# Patient Record
Sex: MALE | Employment: OTHER | URBAN - METROPOLITAN AREA
[De-identification: names, ages, dates, MRNs, and addresses within clinical notes are randomized per-mention and may not be internally consistent; named-entity substitution may affect disease eponyms.]

---

## 2017-03-14 ENCOUNTER — THERAPY VISIT (OUTPATIENT)
Dept: PHYSICAL THERAPY | Facility: CLINIC | Age: 77
End: 2017-03-14
Payer: COMMERCIAL

## 2017-03-14 DIAGNOSIS — M54.2 CERVICALGIA: Primary | ICD-10-CM

## 2017-03-14 PROCEDURE — 97161 PT EVAL LOW COMPLEX 20 MIN: CPT | Mod: GP | Performed by: PHYSICAL THERAPIST

## 2017-03-14 PROCEDURE — 97140 MANUAL THERAPY 1/> REGIONS: CPT | Mod: GP | Performed by: PHYSICAL THERAPIST

## 2017-03-14 NOTE — LETTER
"[facility] ATHLETIC [facility] PHYSICAL THERAPY  [address]  [facility] [address]  [phone]    March 15, 2017    Re: German Manzano   :   1940  MRN:  [id number]   REFERRING PHYSICIAN:   Jens Calderon    [facility] ATHLETIC [facility] PHYSICAL [facility]    Date of Initial Evaluation:  2017  Visits:  Rxs Used: 1  Reason for Referral:  Cervicalgia    EVALUATION SUMMARY    Subjective:  German Manzano is a 76 year old male with a cervical spine condition.  Condition occurred with:  Insidious onset.  Condition occurred: for unknown reasons.  This is a new condition  Patient reports L neck, upper trap and upper arm since about 2/15/17 without specific/known cause.  Radiates to: denies numbness/tingling/weakness or pain below the elbow.  Pain is described as aching and is constant and reported as 5/10.  Associated symptoms:  Loss of motion/stiffness. Pain is worse during the night.  Exacerbated by: turning head to the left, sleep is disturbed. Relieved by: heat, acetaminophen.  Since onset symptoms are unchanged.  Special testing: none.  Previous treatment: prednisone---but discontinued due to swelling in feet, mm relaxant--but discontinued due to feeling \"goofy\" in mornings. General health as reported by patient is good.  Pertinent medical history includes:  Overweight and sleep disorder/apnea.  Medical allergies: no.  Surgical history: hernia R side.  Current medications:  Pain medication, high blood pressure medication, anti-depressants and sleep medication (ibuprofen).  Current occupation is retired.      Barriers include:  None as reported by the patient.  Red flags:  None as reported by the patient.                  Objective:  CERVICAL:  Posture: poor, forward head, slouched, kyphotic (some structural rounding of upper Tspine)  Posture Correction: no effect    Neurological:  Motor Deficit:  Myotomes L R   C4 (shoulder elevation) 5 5   C5 " (shoulder abduction) 5 5   C6 (elbow flexion) 5 5   C7 (elbow extension) 5 5   C8 (thumb extension) 5 5   T1 (finger add/abd) 5 5    Strength (lb) WNL WNL     Sensory Deficit, Reflexes, Dural Signs: intact light touch screen B UE dermatomes    AROM: (Major, Moderate, Minimal or Nil loss)  Movement Loss Walter Mod Min Nil Pain   Protrusion    X Pain decreases   Flexion    X Pain decreases   Retraction   X  Increased pain L upper trap   Extension  X   Increased pain L upper trap   Left Rotation   X  Increased pain L upper trap   Right Rotation    X No effect   Left Side Bending   X  No effect   Right Side bending   X  Increased pain L upper trap     Repeated movement testing:   (During: produces, abolishes, increases, decreases, no effect, centralizing, peripheralizing; After: better, worse, no better, no worse, no effect, centralized, peripheralized)    Pre-test Symptoms Sitting: L neck and upper trap pain   Symptoms During Symptoms After ROM increased ROM decreased No Effect   PRO        Rep PRO        RET        Rep RET increased No worse   X   RET EXT        Rep RET EXT increased No worse   X   LF - R        Rep LF - R        LF - L        Rep LF - L No effect No effect X slight improvement in L rotation after     ROT - R        Rep ROT - R        ROT - L        Rep ROT - L        FLEX        Rep FLEX          Palpation: tender L neck and upper trap    Re: German Manzano   :   1940    Provisional Classification: possible derangement  Principle of Management: will initiate L side bending in sitting, scapular stabilization, soft tissue mobilization, will continue heat at home.      Assessment/Plan:    Patient is a 76 year old male with cervical complaints.    Patient has the following significant findings with corresponding treatment plan.                Diagnosis 1:  Cervicalgia, L side with pain to upper trapezius and L upper arm    Pain -  hot/cold therapy, US, mechanical traction, manual therapy and  directional preference exercise  Decreased ROM/flexibility - manual therapy and therapeutic exercise  Decreased strength - therapeutic exercise and therapeutic activities  Decreased proprioception - neuro re-education and therapeutic activities  Decreased function - therapeutic activities  Impaired posture - neuro re-education    Therapy Evaluation Codes:   1) History comprised of:   Personal factors that impact the plan of care:      None.    Comorbidity factors that impact the plan of care are:      Overweight and Sleep disorder/apnea.     Medications impacting care: Anti-depressant, Anti-inflammatory, High blood pressure, Pain and Sleep.  2) Examination of Body Systems comprised of:   Body structures and functions that impact the plan of care:      Cervical spine.   Activity limitations that impact the plan of care are:      Driving, Dressing, Lifting, Sleeping and Laying down.  3) Clinical presentation characteristics are:   Stable/Uncomplicated.  4) Decision-Making    Low complexity using standardized patient assessment instrument and/or measureable assessment of functional outcome.  Cumulative Therapy Evaluation is: Low complexity.    Previous and current functional limitations:  (See Goal Flow Sheet for this information)    Short term and Long term goals: (See Goal Flow Sheet for this information)     Communication ability:  Patient appears to be able to clearly communicate and understand verbal and written communication and follow directions correctly.  Treatment Explanation - The following has been discussed with the patient:   RX ordered/plan of care  Anticipated outcomes  Possible risks and side effects  This patient would benefit from PT intervention to resume normal activities.   Rehab potential is good.  Re: German Manzano   :   1940    Frequency:  1 X week, once daily  Duration:  for 6 weeks  Discharge Plan:  Achieve all LTG.  Independent in home treatment program.  Reach maximal therapeutic  benefit.    Thank you for your referral.    INQUIRIES  Therapist: Johnnie Garcia DPT  [facility]AP[facility]  [address] [address]  Phone: [phone]  Fax: [phone]

## 2017-03-14 NOTE — PROGRESS NOTES
"[facility] Initial Evaluation    Subjective:    German Manzano is a 76 year old male with a cervical spine condition.  Condition occurred with:  Insidious onset.  Condition occurred: for unknown reasons.  This is a new condition  Patient reports L neck, upper trap and upper arm since about 2/15/17 without specific/known cause. .      Radiates to: denies numbness/tingling/weakness or pain below the elbow.  Pain is described as aching and is constant and reported as 5/10.  Associated symptoms:  Loss of motion/stiffness. Pain is worse during the night.  Exacerbated by: turning head to the left, sleep is disturbed. Relieved by: heat, acetaminophen.  Since onset symptoms are unchanged.  Special testing: none.  Previous treatment: prednisone---but discontinued due to swelling in feet, mm relaxant--but discontinued due to feeling \"goofy\" in mornings.    General health as reported by patient is good.  Pertinent medical history includes:  Overweight and sleep disorder/apnea.  Medical allergies: no.  Surgical history: hernia R side.  Current medications:  Pain medication, high blood pressure medication, anti-depressants and sleep medication (ibuprofen).  Current occupation is retired.        Barriers include:  None as reported by the patient.    Red flags:  None as reported by the patient.                      Objective:  CERVICAL:    Posture: poor, forward head, slouched, kyphotic (some structural rounding of upper Tspine)  Posture Correction: no effect    Neurological:    Motor Deficit:  Myotomes L R   C4 (shoulder elevation) 5 5   C5 (shoulder abduction) 5 5   C6 (elbow flexion) 5 5   C7 (elbow extension) 5 5   C8 (thumb extension) 5 5   T1 (finger add/abd) 5 5    Strength (lb) WNL WNL     Sensory Deficit, Reflexes, Dural Signs: intact light touch screen B UE dermatomes    AROM: (Major, Moderate, Minimal or Nil loss)  Movement Loss Walter Mod Min Nil Pain   Protrusion    X Pain decreases   Flexion    X " Pain decreases   Retraction   X  Increased pain L upper trap   Extension  X   Increased pain L upper trap   Left Rotation   X  Increased pain L upper trap   Right Rotation    X No effect   Left Side Bending   X  No effect   Right Side bending   X  Increased pain L upper trap     Repeated movement testing:   (During: produces, abolishes, increases, decreases, no effect, centralizing, peripheralizing; After: better, worse, no better, no worse, no effect, centralized, peripheralized)    Pre-test Symptoms Sitting: L neck and upper trap pain   Symptoms During Symptoms After ROM increased ROM decreased No Effect   PRO        Rep PRO        RET        Rep RET increased No worse   X   RET EXT        Rep RET EXT increased No worse   X   LF - R        Rep LF - R        LF - L        Rep LF - L No effect No effect X slight improvement in L rotation after     ROT - R        Rep ROT - R        ROT - L        Rep ROT - L        FLEX        Rep FLEX          Palpation: tender L neck and upper trap    Provisional Classification: possible derangement  Principle of Management: will initiate L side bending in sitting, scapular stabilization, soft tissue mobilization, will continue heat at home.      System    Physical Exam    General     ROS    Assessment/Plan:      Patient is a 76 year old male with cervical complaints.    Patient has the following significant findings with corresponding treatment plan.                Diagnosis 1:  Cervicalgia, L side with pain to upper trapezius and L upper arm    Pain -  hot/cold therapy, US, mechanical traction, manual therapy and directional preference exercise  Decreased ROM/flexibility - manual therapy and therapeutic exercise  Decreased strength - therapeutic exercise and therapeutic activities  Decreased proprioception - neuro re-education and therapeutic activities  Decreased function - therapeutic activities  Impaired posture - neuro re-education    Therapy Evaluation Codes:   1) History  comprised of:   Personal factors that impact the plan of care:      None.    Comorbidity factors that impact the plan of care are:      Overweight and Sleep disorder/apnea.     Medications impacting care: Anti-depressant, Anti-inflammatory, High blood pressure, Pain and Sleep.  2) Examination of Body Systems comprised of:   Body structures and functions that impact the plan of care:      Cervical spine.   Activity limitations that impact the plan of care are:      Driving, Dressing, Lifting, Sleeping and Laying down.  3) Clinical presentation characteristics are:   Stable/Uncomplicated.  4) Decision-Making    Low complexity using standardized patient assessment instrument and/or measureable assessment of functional outcome.  Cumulative Therapy Evaluation is: Low complexity.    Previous and current functional limitations:  (See Goal Flow Sheet for this information)    Short term and Long term goals: (See Goal Flow Sheet for this information)     Communication ability:  Patient appears to be able to clearly communicate and understand verbal and written communication and follow directions correctly.  Treatment Explanation - The following has been discussed with the patient:   RX ordered/plan of care  Anticipated outcomes  Possible risks and side effects  This patient would benefit from PT intervention to resume normal activities.   Rehab potential is good.    Frequency:  1 X week, once daily  Duration:  for 6 weeks  Discharge Plan:  Achieve all LTG.  Independent in home treatment program.  Reach maximal therapeutic benefit.    Please refer to the daily flowsheet for treatment today, total treatment time and time spent performing 1:1 timed codes.

## 2017-03-14 NOTE — MR AVS SNAPSHOT
"              After Visit Summary   3/14/2017    German Manzano    MRN: [id number]           Patient Information     Date Of Birth          1940        Visit Information        Provider Department      3/14/2017 11:10 AM Segundo Garcia PT [facility] Physical [facility]        Today's Diagnoses     Cervicalgia    -  1       Follow-ups after your visit        Your next 10 appointments already scheduled     Mar 21, 2017 11:10 AM CDT   LIVIA Spine with Segundo Garcia PT   [facility] Physical Therapy ([facility])    [address] [address]   [phone]              Who to contact     If you have questions or need follow up information about today's clinic visit or your schedule please contact [facility] PHYSICAL [facility] directly at [phone].  Normal or non-critical lab and imaging results will be communicated to you by MyChart, letter or phone within 4 business days after the clinic has received the results. If you do not hear from us within 7 days, please contact the clinic through [employer] or phone. If you have a critical or abnormal lab result, we will notify you by phone as soon as possible.  Submit refill requests through [employer] or call your pharmacy and they will forward the refill request to us. Please allow 3 business days for your refill to be completed.          Additional Information About Your Visit        MyChart Information     [employer] lets you send messages to your doctor, view your test results, renew your prescriptions, schedule appointments and more. To sign up, go to [email or web address] . Click on \"Log in\" on the left side of the screen, which will take you to the Welcome page. Then click on \"Sign up Now\" on the right side of the page.     You will be asked to enter the access code listed below, as well as some personal information. Please " follow the directions to create your username and password.     Your access code is: [email or web address]  Expires: 2017  3:21 PM     Your access code will  in 90 days. If you need help or a new code, please call your [place] clinic or [phone].        Care EveryWhere ID     This is your Care EveryWhere ID. This could be used by other organizations to access your [place] medical records  [id number]         Blood Pressure from Last 3 Encounters:   12/15/14 161/78    Weight from Last 3 Encounters:   12/15/14 96.6 kg (213 lb)              We Performed the Following     HC PT EVAL, LOW COMPLEXITY     LIVIA INITIAL EVAL REPORT     MANUAL THER TECH,1+REGIONS,EA 15 MIN        Primary Care Provider    None Specified       No primary provider on file.        Thank you!     Thank you for choosing [facility] PHYSICAL THERAPY  for your care. Our goal is always to provide you with excellent care. Hearing back from our patients is one way we can continue to improve our services. Please take a few minutes to complete the written survey that you may receive in the mail after your visit with us. Thank you!             Your Updated Medication List - Protect others around you: Learn how to safely use, store and throw away your medicines at www.disposemymeds.org.          This list is accurate as of: 3/14/17  3:21 PM.  Always use your most recent med list.                   Brand Name Dispense Instructions for use    allopurinol 20 mg/mL Susp    ZYLOPRIM     Take 300 mg by mouth daily       AMBIEN PO      Take 10 mg by mouth       amoxicillin-clavulanate 875-125 MG per tablet    AUGMENTIN     Take 1 tablet by mouth 2 times daily       DIAZEPAM PO      Take 5 mg by mouth every 6 hours as needed for anxiety       FLOMAX 0.4 MG capsule   Generic drug:  tamsulosin      Take by mouth daily       polyethylene glycol Packet    MIRALAX/GLYCOLAX     Take 1 packet by mouth daily       potassium  chloride 20 MEQ Packet    KLOR-CON     Take 20 mEq by mouth 2 times daily       PROPRANOLOL HCL PO      Take 20 mg by mouth 2 times daily       PROSCAR PO      Take by mouth daily       REMERON PO      Take 15 mg by mouth       SIMVASTATIN PO      Take 10 mg by mouth

## 2017-03-21 ENCOUNTER — THERAPY VISIT (OUTPATIENT)
Dept: PHYSICAL THERAPY | Facility: CLINIC | Age: 77
End: 2017-03-21
Payer: COMMERCIAL

## 2017-03-21 DIAGNOSIS — M54.2 CERVICALGIA: ICD-10-CM

## 2017-03-21 PROCEDURE — 97140 MANUAL THERAPY 1/> REGIONS: CPT | Mod: GP | Performed by: PHYSICAL THERAPIST

## 2017-03-21 PROCEDURE — 97035 APP MDLTY 1+ULTRASOUND EA 15: CPT | Mod: GP | Performed by: PHYSICAL THERAPIST

## 2017-03-21 PROCEDURE — 97110 THERAPEUTIC EXERCISES: CPT | Mod: GP | Performed by: PHYSICAL THERAPIST

## 2017-03-21 NOTE — MR AVS SNAPSHOT
"              After Visit Summary   3/21/2017    German Manzano    MRN: [id number]           Patient Information     Date Of Birth          1940        Visit Information        Provider Department      3/21/2017 11:10 AM Segundo Garcia PT [facility] Physical Therapy        Today's Diagnoses     Cervicalgia           Follow-ups after your visit        Your next 10 appointments already scheduled     Mar 28, 2017 11:10 AM CDT   [facility] Spine with Segundo Garcia PT   [facility] ([facility]    [address] [address] [phone]              Who to contact     If you have questions or need follow up information about today's clinic visit or your schedule please contact [facility] [employer] [facility] PHYSICAL [facility] directly at [phone].  Normal or non-critical lab and imaging results will be communicated to you by [employer], letter or phone within 4 business days after the clinic has received the results. If you do not hear from us within 7 days, please contact the clinic through [employer] or phone. If you have a critical or abnormal lab result, we will notify you by phone as soon as possible.  Submit refill requests through [employer] or call your pharmacy and they will forward the refill request to us. Please allow 3 business days for your refill to be completed.          Additional Information About Your Visit        MyChart Information     [employer] lets you send messages to your doctor, view your test results, renew your prescriptions, schedule appointments and more. To sign up, go to [email or web address] . Click on \"Log in\" on the left side of the screen, which will take you to the Welcome page. Then click on \"Sign up Now\" on the right side of the page.     You will be asked to enter the access code listed below, as well as some personal information. Please " follow the directions to create your username and password.     Your access code is: [email or web address]  Expires: 2017  3:21 PM     Your access code will  in 90 days. If you need help or a new code, please call your [place] clinic or [phone].        Care EveryWhere ID     This is your Care EveryWhere ID. This could be used by other organizations to access your [place] medical records  [id number]         Blood Pressure from Last 3 Encounters:   12/15/14 161/78    Weight from Last 3 Encounters:   12/15/14 96.6 kg (213 lb)              We Performed the Following     MANUAL THER TECH,1+REGIONS,EA 15 MIN     THERAPEUTIC EXERCISES     ULTRASOUND THERAPY        Primary Care Provider    None Specified       No primary provider on file.        Thank you!     Thank you for choosing [place] [facility] PHYSICAL THERAPY  for your care. Our goal is always to provide you with excellent care. Hearing back from our patients is one way we can continue to improve our services. Please take a few minutes to complete the written survey that you may receive in the mail after your visit with us. Thank you!             Your Updated Medication List - Protect others around you: Learn how to safely use, store and throw away your medicines at www.disposemymeds.org.          This list is accurate as of: 3/21/17 12:03 PM.  Always use your most recent med list.                   Brand Name Dispense Instructions for use    allopurinol 20 mg/mL Susp    ZYLOPRIM     Take 300 mg by mouth daily       AMBIEN PO      Take 10 mg by mouth       amoxicillin-clavulanate 875-125 MG per tablet    AUGMENTIN     Take 1 tablet by mouth 2 times daily       DIAZEPAM PO      Take 5 mg by mouth every 6 hours as needed for anxiety       FLOMAX 0.4 MG capsule   Generic drug:  tamsulosin      Take by mouth daily       polyethylene glycol Packet    MIRALAX/GLYCOLAX     Take 1 packet by mouth daily       potassium chloride 20 MEQ  Packet    KLOR-CON     Take 20 mEq by mouth 2 times daily       PROPRANOLOL HCL PO      Take 20 mg by mouth 2 times daily       PROSCAR PO      Take by mouth daily       REMERON PO      Take 15 mg by mouth       SIMVASTATIN PO      Take 10 mg by mouth

## 2017-05-02 PROBLEM — M54.2 CERVICALGIA: Status: RESOLVED | Noted: 2017-03-14 | Resolved: 2017-05-02

## 2017-05-02 NOTE — PROGRESS NOTES
Subjective:    HPI                    Objective:    System    Physical Exam    General     ROS    Assessment/Plan:      DISCHARGE REPORT    Progress reporting period is from 3/14/17 to 3/21/17 (2 visits).       SUBJECTIVE  Subjective changes noted by patient:  Symptoms mostly the same.  Same area.  Maybe slightly better (4 down from 5/10).      Current pain level is  4/10.     Previous pain level was 5/10.   Changes in function:  None  Adverse reaction to treatment or activity: None    OBJECTIVE  Changes noted in objective findings:    Objective: Cervical AROM pain with extension and L rotation yet.       ASSESSMENT/PLAN  Updated problem list and treatment plan: Diagnosis 1:  Cervicalgia(L neck and upper trap pain to upper arm )  Progress toward STG/LTGs have been made:  See Goal flow sheet completed today.  Assessment of Progress: Patient did not return to therapy. Current status is unknown.  Self Management Plans:  Patient has been instructed in a home treatment program.  Patient continues to require the following intervention to meet STG and LT's:  Patient did not return to therapy.  PT services will be discontinued.     Recommendations:  Patient will be discharged from physical therapy.         Please refer to the daily flowsheet for treatment today, total treatment time and time spent performing 1:1 timed codes.